# Patient Record
(demographics unavailable — no encounter records)

---

## 2024-11-15 NOTE — DISCUSSION/SUMMARY
[de-identified] : Right knee pain follow-up  HPI Patient a 37-year-old female accompanied by her daughter reports to office for subsequent reevaluation of her right knee pain. She has been undergoing formal physical therapy and takes meloxicam as needed for pain. This has given her some relief. She has been using a patella stabilizing brace as well. Certain range of motion, up/down stairs, getting up from seated position, flexing extending the knee all aggravate the patient's pain. Denies any numbness or tingling. She had an MRI done and would like to go over the results of that.  Right knee exam is as follows: Mild effusion noted. No erythema or ecchymosis. Able to perform active straight leg raise. Knee flexion from 0 to 110 degrees with stiffness and pain. Patellofemoral, medial/lateral facet of the patella tenderness to palpation. Calf is soft and nontender. Negative Arun's. Light touch intact throughout. Mild antalgic gait.  Right knee MRI from 7/8/2024 reviewed with the patient in detail. It revealed the following: IMPRESSION: 1. Grade 3 patellar chondral fissuring 2. 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 Edema of the superior lateral infrapatellar fat pad is presumably due to friction with patellar maltracking 4. No meniscal or ligament tear 5. Small nonaggressive chondroid lesion proximal tibia  Assessment/plan Explained MRI results in detail.  She may use a patella stabilizing brace as needed for support/stability.  Continue meloxicam as needed for pain which was refilled to her pharmacy. She will continue formal PT and a new prescription was provided for her.   The knee conditioning program from the [facility] was given to the patient so they may try that at home. follow-up in 3 months.  May consider another injection at next visit if still symptomatic.  All questions/concerns were answered in detail.

## 2024-12-09 NOTE — PROCEDURE
[FreeTextEntry3] : SELECTIVE TRANSFORAMINAL LUMBAR EPIDURAL NERVE ROOT INJECTION UNDER FLUOROSCOPY     Date:  2024  Patient: Valencia Ann  :  1986   Preoperative Diagnosis: Right L3 radiculitis and  Lumbar disc displacement  Procedure: Selective Right L3-4 Transforaminal Lumbar Epidural Nerve Root Injection under Fluoroscopy  Physician: Oly Becerril MD, FAAPMR     Anesthesiologist/CRNA:  Evelio  Anesthesia: See nurses notes/MAC/Cold spray. Versed 2 mg, Fentanyl 100 mcg IVP.  Medical Necessity:  Failure of conservative management.  Indication for Fluoroscopy:  This procedure requires the precise placement of the spinal needle into the specific paravertebral foramen.  It is the only way to accurately and safely perform the injection.     CONSENT: The possible complications including infection, bleeding, nerve damage, Hospital admission, death or failure of the procedure; though unusual, are theoretically possible. The patient was educated about the of the procedure and alternative therapies. All questions were answered and the patient freely gave consent to proceed.     Monitoring:  Patient had continuous blood pressure, EKG, and pulse oximetry throughout the case. See nurse's notes.     PROCEDURE NOTE:  After obtaining written consent, the patient was placed on the fluoroscopic table in the prone position with the pillow placed under the hips. The lumbar area was prepped with betadine solution and draped in the usual manner. A time out was performed. Cold spray was used to localize the area. The fluoroscope was used to identify the L3///L4///L5 vertebral body on the AP projection. It was then rotated into an oblique projection until the superior articulating process of the L4 (inferior) vertebra is projected beneath the 6 o'clock position of the L3 (superior) vertebrae. The 22 gauge 3.5inch needle was inserted in the skin at a point overlying the superior articulating process of the inferior vertebra and aimed for the 6 o'clock position of the superior vertebrae's pedicle.  After the needle contacted bone, a lateral projection was obtained to insure that the needle tip was in proximity with the vertebral body. Paresthesia's were not noted.  One ml of Omnipaque 240 was injected and a neurogram was obtained. Following demonstration of the neurogram, 1 ml of Preservative free normal saline and 1 ml of depomedrol (80mg) was injected. The small volume and relatively high concentration was chosen to preserve selectivity and diagnostic value of the injection. There was no CSF or heme identified. There were no signs of, intravascular block or hypotension. The needle was removed intact. A band aid was place on the site.     Epidurogram: The nerve root was observed in its outline on the neurogram. Distal and proximal spread was noted pointing away from epidural fibrosis/scarring.  Complications: None. The patient tolerated the procedure well.      Disposition: I have examined the patient and there are no new physical findings since the original presentation.  Sensory and motor function were intact. The patient met discharge criteria see nurses notes. The discharge instruction sheet was reviewed and given to the patient. The patient was discharged home with a .  If patient gets sustained relief will have patient do modified planks 3 times a day on carpet or yoga mat starting at 5 seconds building up to 1 minute without grimacing/Valsalva and walking.      Comment: 1st SNRI today, depending on effectiveness would schedule 2nd SNRI in 1-2 weeks vs caudal epidural steroid vs follow up in office depending on insurances. Follow up office. Call if any problems   This document was electronically signed by:   Oly Becerril MD, FAAPMR Diplomate, American Board of Physical Medicine and Rehabilitation Diplomate, American Board of Pain Medicine

## 2024-12-18 NOTE — DATA REVIEWED
[FreeTextEntry1] : MRI of the lumbar spine dated 8/16/2024 demonstrates herniated disc at L3-4 approaching the end compressing the exiting right fourth lumbar root. Small central subligamentous disc herniation at L4-5 and L5-S1.  EMG of the lower extremities dated 8/15/2024 is normal.

## 2024-12-18 NOTE — PHYSICAL EXAM
[Normal Coordination] : normal coordination [Normal DTR UE/LE] : normal DTR UE/LE  [Normal Sensation] : normal sensation [Normal Mood and Affect] : normal mood and affect [Oriented] : oriented [Able to Communicate] : able to communicate [Well Developed] : well developed [Well Nourished] : well nourished [Flexion] : flexion [Extension] : extension [4___] : right hamstring 4[unfilled]/5 [] : light touch is not intact throughout both lower extremities [FreeTextEntry8] : R>L [de-identified] : Decreased sensation in the right leg below the knee

## 2024-12-18 NOTE — ASSESSMENT
[FreeTextEntry1] : This is a 38-year-old female with complaints of lower back pain with radicular features into the right lower extremity. She underwent a RT L3-4 TFESI x1 w/ mac with 50% relief. She was advised to continue with PT exercises and strengthening. We discussed treatment with gabapentin 300 mg, which she is agreeable to trial. She will titrate up to TID dosing. RTO in 8 weeks.  HONG Perez MD

## 2024-12-18 NOTE — HISTORY OF PRESENT ILLNESS
[FreeTextEntry1] : This is a 38-year-old female with complaints of lower back pain with radicular features into the right lower extremity. The pain travels down into the toes and is associated with cramping. She suffered a fall on May 30th 2024, which initially brought on her pain. Pain is associated with numbness and tingling. There is some mild weakness in the right leg which results in difficulty with lifting it. She is referred to me by Dr. San for a pain management consultation. Patient trialed physical therapy which provided her some relief of her numbing sensation, but the pain persists. The MRI of the lumbar spine was reviewed with the patient is documented below. There is evidence of a disc herniation at L3-4 with compression on the L4 nerve root. The option to trial an epidural steroid injection was discussed in detail. She is interested in moving forward.   Of note, she underwent a right knee injection with orthopedics which provided some relief.   TODAY: Patient presents for a revisit. She underwent a right L3-4 TFESI with MAC on 12/6/24 with 50% relief. Patient states her pain has definitely improved. She is still having numbness and tingling in the lower leg region. She has persistent weakness in the right leg, which she has been doing PT for. She is taking Meloxicam as needed for pain relief.

## 2025-02-27 NOTE — DISCUSSION/SUMMARY
[de-identified] : Right knee pain follow-up  HPI Patient a 38-year-old female accompanied by her daughter reports to office for subsequent reevaluation of her right knee pain. She has been undergoing formal physical therapy and takes meloxicam as needed for pain. This has given her some relief.  Walking, certain range of motion, up/down stairs, getting up from seated position, flexing and extending the knee all aggravate the patient's pain. Denies any numbness or tingling.  Right knee exam is as follows: Mild effusion noted. No erythema or ecchymosis. Able to perform active straight leg raise. Knee flexion from 0 to 90 degrees with stiffness and pain. Patellofemoral, medial/lateral facet of the patella tenderness to palpation. Calf is soft and nontender. Negative Arun's. Light touch intact throughout. Mild antalgic gait.  Right knee MRI from 7/8/2024 reviewed with the patient in detail. It revealed the following: IMPRESSION: 1. Grade 3 patellar chondral fissuring 2. 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 Edema of the superior lateral infrapatellar fat pad is presumably due to friction with patellar maltracking 4. No meniscal or ligament tear 5. Small nonaggressive chondroid lesion proximal tibia  Assessment/plan Explained MRI results in detail. Continue meloxicam as needed for pain which was refilled to her pharmacy. She will continue formal PT and a new prescription was provided for her. The patient was advised to rest/ice the area and may alternate with warm compresses as needed.  With the patient's approval, and under sterile technique, I performed a steroid injection today. See the attached procedure note for further details. The patient was informed that their next cortisone injection could not be until a minimum of three months from today's date and the patient understands. Explained to the patient that the full effect of the injection will take 3-5 days to kick in.  Follow-up in 4 months. All questions/concerns were answered in detail.  Procedure Large joint injection was performed of the right knee. The indication for this procedure was pain. The site was prepped with alcohol, ethyl chloride sprayed topically and sterile technique used. An injection of Dexamethasone 20 mg/lidocaine was used.  Patient tolerated procedure well. Patient was advised to call if redness, pain or fever occur and apply ice for 15 minutes out of every hour for the next 12-24 hours as tolerated.    The risks benefits, and alternatives have been discussed, and verbal consent was obtained.

## 2025-03-19 NOTE — ASSESSMENT
[FreeTextEntry1] : This is a 38-year-old female with complaints of lower back pain with radicular features into the right lower extremity. She underwent a RT L3-4 TFESI x1 w/ mac with 50% relief X 3 months. Her symptoms are starting to return back to baseline. She will re-start gabapentin 300 mg TID. I have refilled the medication for her today. She will proceed with a repeat right L3-4 TFESI without MAC. RTO post-injection  Patient had a MRI that shows a radicular component along with pain referred into the lower extremity. Patient has trialed rehab (home exercise, physical therapy or chiropractic care) and medications. I will schedule a Right L3-4 TFESI.  Risk, benefits, pros and cons of procedure were explained to the patient using models and diagrams and their questions were answered.  ATTESTATION: I personally reviewed with the PA, this patient's history and physical exam findings, as documented above. I have discussed the relevant areas of concern, having direct implications to the presenting problems and illnesses, and I have personally examined all pertinent and positive and negative findings, which impact their pain management treatment.   I, Dr. Becerril, attest that this documentation has been prepared by Nadja Bailey PA-C under my presence and direction.  HONG Preez MD

## 2025-03-19 NOTE — PHYSICAL EXAM
[Normal Coordination] : normal coordination [Normal DTR UE/LE] : normal DTR UE/LE  [Normal Sensation] : normal sensation [Normal Mood and Affect] : normal mood and affect [Oriented] : oriented [Able to Communicate] : able to communicate [Well Developed] : well developed [Well Nourished] : well nourished [Flexion] : flexion [Extension] : extension [4___] : right hamstring 4[unfilled]/5 [] : light touch is not intact throughout both lower extremities [FreeTextEntry8] : R>L [de-identified] : Decreased sensation in the right leg below the knee

## 2025-03-19 NOTE — HISTORY OF PRESENT ILLNESS
[FreeTextEntry1] : This is a 38-year-old female with complaints of lower back pain with radicular features into the right lower extremity. The pain travels down into the toes and is associated with cramping. She suffered a fall on May 30th 2024, which initially brought on her pain. Pain is associated with numbness and tingling. There is some mild weakness in the right leg which results in difficulty with lifting it. She is referred to me by Dr. San for a pain management consultation. Patient trialed physical therapy which provided her some relief of her numbing sensation, but the pain persists. The MRI of the lumbar spine was reviewed with the patient is documented below. There is evidence of a disc herniation at L3-4 with compression on the L4 nerve root. The option to trial an epidural steroid injection was discussed in detail. She is interested in moving forward.   Of note, she underwent a right knee injection with orthopedics which provided some relief.   TODAY: Patient presents for a revisit. She is under our care for lower back pain with right lower extremity pain. She underwent a right L3-4 TFESI with MAC on 12/6/24 with 50% relief for approximately 3 months. Patient states her pain had definitely improved during that time period. Because of persistent numbness and tingling in the lower leg region, she was prescribed gabapentin 300 mg TID. She states she took the medication for 2 months and then ran out of it. Her symptoms were significantly better when she took the medication. Patient states she is starting to feel an increase in her lower back pain and pain down her right leg. She is taking Meloxicam as needed for pain relief. She completed PT at the end of December and has been doing her own HEP at home. We spoke about re-starting her on gabapentin which she is agreeable with. She will also undergo a repeat injection.